# Patient Record
(demographics unavailable — no encounter records)

---

## 2025-07-16 NOTE — HISTORY OF PRESENT ILLNESS
[FreeTextEntry1] : 49 y/o Ismael GAMEZ presents for initial evaluation.   Reason for visit: hemorrhoids  Referred by: Dr. Queen  PMHx: gout, kidney stones, DM2 PSHx: appendectomy, laser sx for kidney stones   Meds: allopurinol, anusol, psyllium husk Allergies: pollen  Social: (+) ETOH use  Family Hx: colon cancer Colonoscopy: Last done with 3/2023  6/7/25 Seen initially by Dr. Queen with complaints of rectal bleeding. Denies constipation.  Has had rubber band ligation x2 performed in the past with GI Dr. Yeong.  Has trialed with anusol with no symptom improvement. Reports will see blood when not taking any stool softener.  Rectal exam revealed bulging external hemorrhoid with moderate tenderness. No fistula or abscess noted.  Encouraged proper hydration with high fiber intake. Advised continuing colace and sitz baths.  Referred to Dr. Wolfe.   Reports rubber band ligation 2/25.  Persistent blood tissue paper and in the bowl. BM- intermittent constipation/straining. Taking stool ofteners - once daily. Pain with BM

## 2025-07-16 NOTE — ASSESSMENT
[FreeTextEntry1] : I had a detailed discussion with the patient regarding optimization of bowel movements with increasing daily fiber and water intake. Both synthetic and dietary fiber recommendations were reviewed. I had strongly counseled the patient regarding the need for increasing water to help improve  bowel function.  I discussed with the patient that improving  bowel function will alleviate  hemorrhoidal symptoms.  Advised return in 4-6 weeks if symptoms are persistent.  A Chinese  was utilized for the entire visit. All questions were addressed.

## 2025-07-16 NOTE — PHYSICAL EXAM
[Excoriation] : no perianal excoriation [Skin Tags] : there were no residual hemorrhoidal skin tags seen [Normal] : was normal [None] : there was no rectal mass  [FreeTextEntry1] : Medical assistant was present for the entire exam.  Anoscopy was performed for evaluation of the patients rectal bleeding  history . The risks, benefits and alternatives were reviewed.  A lighted anoscope was passed into the anal canal and the entire anal mucosal surface was inspected..   The findings revealed moderate/ large rpq internal hemorrhoids. No masses or lesions were identified.  The risks and benefits of rubber band ligation were discussed with the patient including but not limited to bleeding, pain, infection, and the need for future procedures. The anoscope was placed and rubber band ligation was performed of the internal hemorrhoids- RPQ with good result. The patient tolerated the procedure well. Appropriate postprocedure instructions were given to the patient.